# Patient Record
Sex: MALE | URBAN - METROPOLITAN AREA
[De-identification: names, ages, dates, MRNs, and addresses within clinical notes are randomized per-mention and may not be internally consistent; named-entity substitution may affect disease eponyms.]

---

## 2018-05-30 ENCOUNTER — EMERGENCY (EMERGENCY)
Facility: HOSPITAL | Age: 58
LOS: 1 days | Discharge: HOME | End: 2018-05-30
Attending: EMERGENCY MEDICINE
Payer: SELF-PAY

## 2018-05-30 VITALS — WEIGHT: 179.9 LBS | HEIGHT: 70 IN

## 2018-05-30 VITALS
OXYGEN SATURATION: 99 % | DIASTOLIC BLOOD PRESSURE: 76 MMHG | SYSTOLIC BLOOD PRESSURE: 137 MMHG | HEART RATE: 76 BPM | TEMPERATURE: 98 F | RESPIRATION RATE: 18 BRPM

## 2018-05-30 PROCEDURE — 99283 EMERGENCY DEPT VISIT LOW MDM: CPT

## 2018-05-30 RX ORDER — CIPROFLOXACIN HCL 0.3 %
2 DROPS OPHTHALMIC (EYE)
Qty: 15 | Refills: 0 | OUTPATIENT
Start: 2018-05-30 | End: 2018-06-03

## 2018-05-30 NOTE — ED ADULT NURSE NOTE - OBJECTIVE STATEMENT
pt presents to ED with left eye pain x few days. pt states "I think I got a piece of dust in my eye" mild redness noted. [id number]^&[id number]. will continue to monitor and reassess

## 2018-05-30 NOTE — ED ADULT TRIAGE NOTE - CHIEF COMPLAINT QUOTE
"I have blurry vision in my left eye for about 4-5 days, I don't have insurance. " Pt has redness in left eye and redness to the outside of  left eye.

## 2018-05-30 NOTE — ED PROVIDER NOTE - OBJECTIVE STATEMENT
57 year old male with no pertinent PMh presents with left eye pain and blurry vision. Pt states that 5 days ago while driving he felt something get into his eye. He reports that for several days he began to have pain and itching to the eye with redness, and blurry vision. The redness has improved, pain and itching resolved, but the blurry vision remains. Denies fevers, chills, HA, discharge

## 2018-05-30 NOTE — ED PROVIDER NOTE - MEDICAL DECISION MAKING DETAILS
No discharge, no pain/firmness of eye, not clinically consistent with acute glaucoma, no hypopyon, no sign of infection. Small corneal abrasion, will tx with drop and optho f/u